# Patient Record
Sex: FEMALE | Race: OTHER | HISPANIC OR LATINO | URBAN - METROPOLITAN AREA
[De-identification: names, ages, dates, MRNs, and addresses within clinical notes are randomized per-mention and may not be internally consistent; named-entity substitution may affect disease eponyms.]

---

## 2023-10-24 NOTE — PROGRESS NOTES
COMP EXAM, FMX, PROBE EXAM   Pt's preferred language is Portuguese- used  # [id number]    REVIEWED MED HX:   -last physical over 2 years ago. Pt is interested in scheduling for a physical  No current meds, allergies or health concerns    CHIEF CONCERN:     -Last dental visit was 2 years ago   - pt " here for checkup, but I'm having pain in 2 teeth. One on UR and one on UL. When I eat, the food gets stuck in the holes. No current pain reported. PAIN SCALE:  0  ASA CLASS:  I  PLAQUE:  mild   CALCULUS:  mod calculus  BLEEDING:   Some bleeding upon probing  STAIN :   none   ORAL HYGIENE:  needs improvement   PERIO:     Visual and Tactile Intraoral/ Extraoral evaluation: Oral and Oropharyngeal cancer evaluation. No findings     Dr. Rissa Raymundo exam=   Reviewed with patient clinical and radiographic findings and patient verbalized understanding. All questions and concerns addressed.      REFERRALS: no referrals needed    CARIES FINDINGS: 3-OL, 4-MOD, 5- DO, 8- DIFL, 12-DO, 13-MOD, 17-O, 20-MOB, 31-OB, 32-O       NEXT VISIT:   1)    Last FMX :

## 2023-10-25 ENCOUNTER — OFFICE VISIT (OUTPATIENT)
Dept: DENTISTRY | Facility: CLINIC | Age: 43
End: 2023-10-25

## 2023-10-25 VITALS — SYSTOLIC BLOOD PRESSURE: 106 MMHG | TEMPERATURE: 87 F | DIASTOLIC BLOOD PRESSURE: 71 MMHG

## 2023-10-25 DIAGNOSIS — Z01.20 ENCOUNTER FOR DENTAL EXAMINATION: Primary | ICD-10-CM

## 2023-10-25 DIAGNOSIS — Z00.00 PHYSICAL EXAM: ICD-10-CM

## 2023-10-25 PROBLEM — K05.30 PERIODONTITIS: Status: ACTIVE | Noted: 2023-10-25

## 2023-10-25 PROCEDURE — D0210 INTRAORAL - COMPLETE SERIES OF RADIOGRAPHIC IMAGES: HCPCS

## 2023-10-25 PROCEDURE — D0150 COMPREHENSIVE ORAL EVALUATION - NEW OR ESTABLISHED PATIENT: HCPCS

## 2023-10-25 NOTE — PATIENT INSTRUCTIONS
Caries en los cheryl     CUIDADO AMBULATORIO:     La caries dental , también llamada cavidad dental, son orificios en los dientes causados por bacterias. Las bacterias se mezclan con los carbohidratos de los alimentos y generan ácidos. El ácido descompone áreas del esmalte que cubre la parte exterior del diente. Las caries en la infancia son orificios que se bonifacio en los dientes de cruz hijo o [place]. [place] suele ocurrir [employer] [address] [place]. Las cavidades pueden comenzar tan pronto martínez el diente comienza a erupcionar (Idelia Solid a través del tejido de la encía). Las caries en la infancia a veces se llaman caries del biberón nocturno o del biberón del bebé. Las caries son causadas por chelle bacteria. Las bacterias se mezclan con los carbohidratos de los alimentos y generan ácidos. El ácido descompone áreas del esmalte que cubre la parte exterior del diente. Los signos y síntomas comunes son: Los signos más tempranos son manchas lupe a lo alanna de la línea de las encías, cerca de los dientes delanteros superiores. Es posible que no pueda colby estas manchas. Es posible que cruz hijo no tenga ningún síntoma si la caries recién ha empezado a formarse. Cuando la caries llega a partes más profundas de los dientes de cruz hijo, podría sentir dolor. Es posible que cruz hijo también presente cualquiera de los siguientes:  Cruz hijo siente dolor al masticar o comer alimentos calientes o fríos    Dientes de color simpson tiza, amarillos o marrones    Hinchazón de las encías    Busque atención médica de inmediato si:  Cruz hijo tiene dolor intenso en el diente o la [place]. Cruz hijo tiene hinchazón en [facility]. Llame al dentista de cruz hijo si:  Cruz hijo tiene fiebre. El dolor dental de cruz hijo empeora. Usted tiene preguntas o inquietudes sobre la condición o el cuidado de cruz hijo. El tratamiento de las caries en la infancia es importante, incluso en los dientes de [place].  Dientes sanos a chelle edad temprana ayudarán a que cruz hijo tenga dientes saludables martínez adulto. Dependiendo de la edad de cruz hijo, también podría necesitar cualquiera de los siguientes tratamientos:  Los tratamientos con flúor se pueden administrar marija la consulta dental. Cruz hijo podría usar productos con flúor en casa. El dentista de cruz hijo le indicará qué tipo de flúor necesita cruz hijo y cómo usarlo. Un empaste puede aplicársele en el diente de cruz hijo después de que le hayan extraído la parte deteriorada. El empaste podría ayudar a evitar un deterioro mayor en el diente de cruz hijo. Un conducto radicular podría ser necesario si el diente está infectado o si la caries es grave. Ayude a prevenir las caries en la infancia:  Lleve cruz hijo al odontólogo 2 veces al [employer]. Cruz hijo debería empezar a colby un dentista cuando aparezca el primer diente o cuando cumpla un año. El dentista puede encontrar y tratar los problemas de Phoebe Frees. [place] puede ayudar a prevenir las caries dentales. El dentista puede darle a cruz hijo un tratamiento con fluoruro para ayudar a prevenir las caries. El dentista de cruz hijo puede recetarle un suplemento de flúor si el suministro de agua local tiene un nivel bajo de flúor. No acueste a cruz trip a dormir o nick chelle siesta con el biberón. [employer], la de fórmula y el jugo de frutas contienen azúcar. Si cruz trip se duerme con el biberón en la boca, estos líquidos pueden quedarse en cruz boca y causarle caries. En cambio, sostenga a cruz hijo mientras lo alimenta. Limpie los dientes de cruz hijo con un paño limpio después de alimentarlo. Luego póngalo a dormir. Alimente a cruz trip con alimentos y bebidas saludables. Escoja alimentos y bebidas que julius bajos en azúcar. Sharron las etiquetas de los alimentos y bebidas para saber cuáles alimentos escoger que julius bajos en azúcar. Limite los dulces, las galletas y las [address]  No moje el chupete de cruz hijo en azúcar, almíbar o cualquier otro líquido tiffanie. Limite el consumo de jugo de frutas según indicaciones. El jugo de frutas tiene alto contenido de azúcar. No le de jugo de frutas a cruz bebé en un biberón. No le de jugo de frutas a cruz hijo en un vaso que pueda llevar consigo marija el día. Desde los 6 meses hasta el primer año, limite el consumo de jugo de frutas a 4 onzas por día. De 1 a 6 años, limite la cantidad de 4 a 6 onzas por día. Enséñele a cruz hijo a beber de chelle taza común tan pronto martínez sea posible. Cruz hijo debería poder beber de chelle taza a la edad de 12 meses. Wilhemena Gory común hará que cruz hijo lauri más lento y con Shanita Nipper. [place] significa que va a beber líquidos azucarados más lentamente que con un biberón o un vaso con sorbete. Cómo cepillarle los dientes a cruz hijo:  Desde el nacimiento hasta 1 año: use chelle toallita para limpiar las encías de cruz bebé. Puede empezar a [employer] de cruz bebé tan pronto martínez comiencen a aparecer. Utilice un cepillo de dientes de bebé con un cabezal suave. Ponga chelle pequeña cantidad (del tamaño de un grano de arroz) de pasta dental con flúor en el cepillo de dientes. Pase chelle toallita sobre los dientes para eliminar cualquier jefferson de pasta dental. Cepille 1 vez por día. Niños de 1 a 3 años: cruz hijo necesita cepillarse los dientes 2 veces al día. Cepíllele los dientes a cruz trip con un cepillo de dientes para niños y [place]. El médico de cruz hijo puede recomendarle que le cepille los dientes con chelle pequeña cantidad de pasta dental que contenga flúor. Asegúrese de que cruz trip escupa toda la pasta de dientes de cruz boca. No necesita enjuagarse con agua. La pequeña cantidad de pasta de dientes que permanece en la boca de cruz hijo puede ayudar a prevenir las caries. Niños de 3 a 6 años: cruz hijo necesita cepillarse los dientes con pasta dental con flúor 2 veces al día. Usted también debería pasarle hilo dental a cruz hijo chelle vez al día. Cepille marija 2 minutos martínez mínimo.  Aplique chelle cantidad del tamaño de un guisante de pasta dental en el cepillo de dientes. Asegúrese de que cruz trip escupa toda la pasta de dientes de cruz boca. No necesita enjuagarse con agua. La pequeña cantidad de pasta de dientes que permanece en la boca de cruz hijo puede ayudar a prevenir las caries. Ayude a cruz hijo con el cuidado de los [address] que pueda [employer] Cruz hijo debe comenzar a cuidar codi dientes a los 7 u 8 años. Cepille marija 2 minutos, 2 veces cada día. Wilkins Bienenstock puede ser reproducir chelle canción que dure 2 minutos martínez mínimo mientras cruz hijo se cepilla. Solo debería necesitar hacer esto hasta que cruz hijo se acostumbre a la cantidad de [place]. Ban que cruz hijo escupa la pasta dental después del cepillado. No necesita enjuagarse con agua. La pequeña cantidad de pasta de dientes que permanece en la boca de cruz hijo puede ayudar a prevenir las caries. Cruz hijo también necesita usar el hilo dental 1 vez al día. Acuda a las consultas de control con el dentista según le indicaron: Anote codi preguntas para que se acuerde de hacerlas marija codi visitas. © Copyright [employer] 2022 Information is for End User's use only and may not be sold, redistributed or otherwise used for commercial purposes. All illustrations and images included in CareNotes® are the copyrighted property of A.D.A.M., Inc. [address] es sólo para uso en educación. Cruz intención no es darle un consejo médico sobre enfermedades o tratamientos. Colsulte con cruz [facility] farmacéutico antes de seguir cualquier régimen médico para saber si es seguro y efectivo para usted. Enfermedad periodontal   LO QUE NECESITA SABER:   ¿Qué es la enfermedad periodontal? A la enfermedad periodontal también se la conoce martínez enfermedad de las encías. Puede ser leve y causar problemas, martínez mal aliento. Puede progresar y, en los casos graves, puede destruir el hueso y derivar en la pérdida del diente.   ¿Qué causa la enfermedad de las encías? Generalmente, la causa de la enfermedad de las encías es la acumulación de placa en los dientes y las encías. La placa contiene bacterias que son perjudiciales para el tejido de la encía. ¿Qué aumenta mi riesgo de tener enfermedad de las encías? La brian higiene bucal, por ejemplo, no cepillarse los dientes y no usar el hilo dental     Nutrición deficiente     Determinados medicamentos que reducen la cantidad de saliva en la boca     Los factores genéticos o las enfermedades, martínez la diabetes y el síndrome de [place]     El hábito de fumar o mascar tabaco     ¿Cuáles son los signos y síntomas de la enfermedad de las encías? En las primeras etapas de la enfermedad de las encías (gingivitis):      Las encías le sangran al cepillarse los dientes o usar el hilo dental.     [address] encías inflamadas, enrojecidas o sensibles. Tiene mal aliento que no desaparece. A medida que la enfermedad de las encías empeora: Se le forma sarro (costra) en los dientes. Las [employer] sangran y se separan de los dientes. Los dientes pueden aflojarse y posiblemente caerse. ¿Cómo se diagnostica la enfermedad de las encías? Gibbons dentista le revisará los dientes y las encías. Le tomará radiografías de la boca. El examen mostrará la ayleen de las encías. Las [employer] ayleen de los [place]. ¿Cómo se trata la enfermedad de las encías? Gibbons dentista puede llevar a cabo la mayor parte del tratamiento para la enfermedad de las encías. El [address] común es chelle buena limpieza dental por encima y por debajo de la línea de las encías. Pueden indicarle que use un enjuague bucal o un gel medicinal para matar las bacterias. En algunos casos, pueden derivarlo a un especialista o puede necesitar cirugía. ¿Cómo puedo evitar la enfermedad de las encías? Cepíllese los dientes 2 veces al día. Cepíllese marija 2 minutos cada vez. Use hilo dental todos los días.  Gibbons dentista puede enseñarle cómo utilizar el hilo dental correctamente. Use chelle crema dental que contenga flúor. Es posible que también deba usar un enjuague bucal que contenga flúor. Use un cepillo de dientes de cerdas suaves. Las cerdas duras pueden dañarle las encías. Cambie cruz cepillo de dientes cada 3 a 4 meses. Los cepillos de dientes desgastados pueden dañar las encías y no quitar sole la placa. Consuma alimentos saludables. Evite los [employer] comidas para disminuir el riesgo de tener enfermedad de las encías. Los alimentos saludables no bonifacio placa tan fácilmente martínez los que tienen un alto contenido de azúcar. Consuma chelle variedad de frutas, verduras, brody magras, pescado, productos lácteos bajos en grasa, panes integrales y frijoles cocidos. No fume y no mastique tabaco. El tabaco [place] el riesgo de tener enfermedad de las encías. Nunca es demasiado tarde para dejar de consumir tabaco. Solicite información a cruz médico si usted necesita ayuda para dejar de fumar. Visite al dentista cada 6 meses o más a menudo martínez se le haya indicado, para limpiezas y cuidados preventivos. Las visitas periódicas ayudarán a la detección temprana de los [facility]. ¿Cuándo jean marie comunicarme con mi dentista? Las encías le sangran habitualmente cuando se cepilla los dientes o [place] el hilo dental.     Tiene las encías hinchadas o sensibles. Irlanda Boom a alejarse de los dientes. Se le aflojan los dientes. Usted tiene preguntas o inquietudes acerca de cruz condición o cuidado. ACUERDOS SOBRE CRUZ CUIDADO:   Usted tiene el derecho de ayudar a planear cruz cuidado. Aprenda todo lo que pueda sobre cruz condición y martínez darle tratamiento. Discuta codi opciones de tratamiento con codi médicos para decidir el cuidado que usted desea recibir. Usted siempre tiene el derecho de rechazar el tratamiento. Esta información es sólo para uso en educación.  Cruz intención no es darle un consejo [employer] An[employer] enfermedades o tratamientos. Colsulte con cruz Cliff Bannister farmacéutico antes de seguir cualquier régimen médico para saber si es seguro y efectivo para usted.   © Copyright Preetshashi Terry 2022 Information is for End User's use only and may not be sold, redistributed or otherwise used for commercial purposes

## 2023-10-25 NOTE — DENTAL PROCEDURE DETAILS
Osmin Olvera presents for a Comprehensive exam. Verbal consent for treatment given in addition to the forms. Reviewed health history - Patient is ASA I  Consents signed: Yes     Perio: Generalized  Pain Scale: 0  Caries Assessment: High  Radiographs: Complete mouth series        COMP EXAM, FMX, PROBE EXAM   Pt's preferred language is Estonian- used  # [id number]    REVIEWED MED HX:   - pt has no medical history in EPIC. No Estonian paper version available in office. Reviewed medical history verbally with patient and . Will need pt to fill out prior to any continued treatment.   -last physical over 2 years ago. Pt is interested in scheduling for a physical  No current meds, allergies or health concerns    CHIEF CONCERN:     -Last dental visit was 2 years ago   - pt " here for checkup, but I'm having pain in 2 teeth. One on UR and one on UL. When I eat, the food gets stuck in the holes. No current pain reported. PAIN SCALE:  0  ASA CLASS:  I  PLAQUE:  mild   CALCULUS:  mod calculus  BLEEDING:   Some bleeding upon probing  STAIN :   none   ORAL HYGIENE:  needs improvement   PERIO: stage 3/ Grade A    Visual and Tactile Intraoral/ Extraoral evaluation: Oral and Oropharyngeal cancer evaluation. No findings     Dr. Xochilt Barton exam=   Reviewed with patient clinical and radiographic findings and patient verbalized understanding. All questions and concerns addressed. ---> discussed translated in Estonian options for #2 with patient. RcT vs Extraction. Pt opt for extraction. ---> pt questioned wear on incisal 8 + 9.  discussed best option is to place crowns. ---> information re: cavities and perio disease placed in AVS, printed and handed to patient in [address] [place] [address]. Pt was informed by  that she needs sc/rp in 2 visits. Bone loss present.      REFERRALS: placed referral to family medicine to try to get patient to schedule a physical.     CARIES FINDINGS: 3-OL, 4-MOD, 5- DO, 8- DIFL, 12-DO, 13-MOD, 17-O, 20-MOB, 31-OB, 32-O.         NEXT VISIT:   1) extractions here for #2 and #16 (severely decayed teeth)  2) sc/rp UR + LR ( 60 min)  3) sc/rp UL + LL ( 60 min)  4) fillings     Last FMX : 10/25/23

## 2023-11-01 ENCOUNTER — OFFICE VISIT (OUTPATIENT)
Dept: DENTISTRY | Facility: CLINIC | Age: 43
End: 2023-11-01

## 2023-11-01 VITALS — SYSTOLIC BLOOD PRESSURE: 107 MMHG | DIASTOLIC BLOOD PRESSURE: 76 MMHG | HEART RATE: 92 BPM

## 2023-11-01 DIAGNOSIS — K08.89 NONRESTORABLE TOOTH: ICD-10-CM

## 2023-11-01 DIAGNOSIS — Z59.9 FINANCIAL DIFFICULTIES: Primary | ICD-10-CM

## 2023-11-01 PROCEDURE — D7140 EXTRACTION, ERUPTED TOOTH OR EXPOSED ROOT (ELEVATION AND/OR FORCEPS REMOVAL): HCPCS

## 2023-11-01 SDOH — ECONOMIC STABILITY - INCOME SECURITY: PROBLEM RELATED TO HOUSING AND ECONOMIC CIRCUMSTANCES, UNSPECIFIED: Z59.9

## 2023-11-01 NOTE — DENTAL PROCEDURE DETAILS
Extraction #2, #16    Patient presents for Ext #2, #16. PMH reviewed, no changes. ASA I. Patient denies medications and medical conditions. Reports no hx of cardiac valve replacement that would require abx prophylaxis. No blood thinners reported. No bodily replacements or implants. Proceeded with extraction appointment. NKFDA    No pain reported by patient at the start of today's appointment. Oral cancer screening completed. No significant findings or soft tissue abnormalities. Obtained a direct and personal consent to ext #2 and #16. Risks and complications were explained. Pt agreed and consented. Consent scanned in doc center. Pre-Op BP WNL. [place] Protocol    Other Assisting Provider: Yes, (Belkis)    Verbal consent obtained? YES  Written consent obtained? YES    Risks, benefits and alternatives discussed?: YES    Consent given by: Oscar Bolt. Reyes    Time Out  Immediately prior to the procedure a time out was called: YES    Time Out: 1:10 PM    A time out verifies correct patient, procedure, equipment, support staff and site/side marked as required. Patient states understanding of procedure being performed: YES    Patient's understanding of procedure matches consent: YES    Procedure consent matches procedure scheduled: YES    Test results available and properly labeled: N/A    Site  Verified with the patient  YES (#2, #16)    Radiology Images displayed and confirmed. If images not available, report reviewed:  YES    Required items - Required blood products, implants, devices and special equipment available: YES    Patient identity confirmed:  YES    Administered  1.7 mL of 2 % Lidocaine w/ 1:100,000 epi via palatal infiltration at root apices #s 2, 16. 3.4 mL of 4% Articaine w/ 1:100,000 epi via papillary infiltration and maxillary infiltration at root apices #s 2, 16 after negative aspiration. Adequate anesthesia obtained. ? Reflected gingiva, elevated, and extracted #2, #16.  Manual alveolar compression. Sockets irrigated. Post-op radiograph completed - confirmed complete removal of tooth structures. Adequate bleeding/clotting. No sutures required. Complications: none (extractions were atraumatic)    Upon dismissal, patient received POI, ice, gauze. Recommended pt abstain from eating until anesthesia wears off to prevent lip/cheek biting. If patient needs to rinse, use warm salt water without swishing or spitting. Abstain from grainy food or small particles that can infiltrate sockets and interfere with normal healing. Recommended pt take OTC pain medications, such as ibuprofen or acetaminophen, every 6-8 hours or as needed. Patient understands POI. Provided written POI in [place]. RX: none    All questions and concerns addressed. Encouraged pt to return for deep cleaning appointments. Patient left comfortably and ambulatory.     Attending: Dr. Rimma Hines    [place]: begin SRPs

## 2023-11-06 ENCOUNTER — PATIENT OUTREACH (OUTPATIENT)
Dept: DENTISTRY | Facility: CLINIC | Age: 43
End: 2023-11-06

## 2023-11-06 NOTE — PROGRESS NOTES
SW CM referral received 11/1 from Dr. Irasema Frazier, DMD r/t pt [facility] needs. Chart review completed. Per chart review, pt completed [facility] assessment with at risk responses for financial resource strain, transportation needs and food insecurity. Per chart review, pt is [place] speaking. Per chart review, pt has not completed financial assistance application for [facility]. OP SWCM placed call to pt using [employer]  #[phone] to introduce self, role and reason for calling. OP SWCM received pt's VM box, but was unable to leave a VM due to pt's mailbox being full. OP SWCM will call back another time.

## 2023-11-07 NOTE — PROGRESS NOTES
SCALE AND RP UR + LR   CHIEF CONCERN: none   PAIN SCALE: 0  ASA CLASS: I  PLAQUE: moderate  CALCULUS: heavy accumulation   BLEEDING: moderate  STAIN : light   ORAL HYGIENE:  fair  PERIO: 3/A    DR. Mancilla Gave 1 carpule 2% Lidocaine 1:100K epi inifltrations on UR.  1 carpule 2% Lidocaine 1:100K epi SURJIT block right and 1 carpule 4% septocaine 1:100K epi infiltrations on lower right. Pt tolerated very well. Hand scaled, polished and flossed. Used cavitron    Oral Hygiene Instruction:  recommended brushing 2 x daily for 2 minutes MIN, recommended flossing daily, reviewed dietary precautions.  Post op sc/rp instructions in Portuguese placed in AVS and printed for patient    Soft tissue exam:  soft tissue exam was normal  ExtraOral exam:   Extraoral exam was normal    REFERRALS: no referrals needed         NEXT VISIT:   --->sc/rp UL + LL next visit    Last  FMX : 10/25/23

## 2023-11-08 ENCOUNTER — OFFICE VISIT (OUTPATIENT)
Dept: DENTISTRY | Facility: CLINIC | Age: 43
End: 2023-11-08

## 2023-11-08 VITALS — HEART RATE: 108 BPM | DIASTOLIC BLOOD PRESSURE: 78 MMHG | SYSTOLIC BLOOD PRESSURE: 122 MMHG

## 2023-11-08 DIAGNOSIS — K05.6 PERIODONTAL DISEASE: Primary | ICD-10-CM

## 2023-11-08 PROCEDURE — D4341 PERIODONTAL SCALING AND ROOT PLANING - 4 OR MORE TEETH PER QUADRANT: HCPCS

## 2023-11-08 NOTE — PATIENT INSTRUCTIONS
Instrucciones posoperatorias de raspado + alisado radicular    Le realizaron un procedimiento llamado raspado + alisado radicular (SRP, por codi siglas en inglés). Alejandro es un tratamiento no quirúrgico de la periodontitis (enfermedad de las encías). El propósito de alejandro tratamiento es eliminar la placa bacteriana y el sarro de la superficie de la raíz debajo de la línea de las encías, ya que pueden causar problemas de ayleen graves, incluida la pérdida [place]. El objetivo de alejandro tratamiento es permitir la reinserción de las encías a la superficie limpia de la raíz y reducir las bolsas periodontales a niveles que se puedan mantener con el uso diario de hilo dental y con el cepillado. Seguimiento del procedimiento  Cuando se [place] anestesia, los labios, dientes y lengua pueden permanecer entumecidos después del procedimiento. El tiempo que están entumecidos varía  según el tipo de anestesia recibida. Tenga cuidado con la lengua y los labios. Es fácil morderse o quemarse la lengua o los labios mientras están entumecidos. No coma ni lauri nada que esté muy caliente. Sería conveniente que se abstuviera de comer o beber algo hasta que el entumecimiento haya desaparecido. Malestar o dolor  Es normal que marija unos días sienta dolor en el tejido de las encías. Puede  nick un analgésico de venta lenny martínez Advil o Aleve si tiene sensibilidad en las encías. Si debe evitar estos analgésicos porque ya está tomando fármacos antiinflamatorios no esteroideos (NSAID, por codi siglas en inglés), es alérgico a ellos o tiene úlceras, entonces puede nick acetaminofén (Tylenol). Siga las recomendaciones de dosificación de las etiquetas de los productos. Sensibilidad en los dientes  Es normal que los dientes estén más sensibles a las temperaturas frías o calientes o a los dulces. [place] ocurre a [employer] tejido de las encías se [place] y se reduce de [place].  Esta sensibilidad es la [address] frecuente después del raspado y [place] radicular. [place] sensibilidad debería desaparecer de a poco en unas pocas semanas. Cepillarse los dientes todos los días con pasta dental para la sensibilidad o usar enjuagues con flúor puede ayudar a aliviar esto con [employer]. Evite fumar/consumir productos de tabaco  Se debe evitar fumar y consumir productos de tabaco por un mínimo de 24 a 48 horas. El consumo de tabaco retrasa el proceso de curación y [place] la posibilidad de tener complicaciones posoperatorias. Se recomienda dejar de fumar. Sangrado  El sangrado leve puede continuar marija 24 a 48 horas. Munjor es normal y debería disminuir. Puede volver al [place], kandy se recomienda limitar la actividad extenuante marija las 24 horas después del procedimiento, ya que esto puede aumentar el sangrado. Marija el jefferson del día, evite enjuagarse la boca enérgicamente, ya que esto prolongará el sangrado. Si el sangrado continúa, ejerza chelle leve presión en la genie con [place] gasa humedecida o chelle bolsita de té humedecida. Déjela en el lugar por 20 o 30 minutos. Si aún le preocupa el sangrado después de seguir estas instrucciones, comuníquese con nuestro consultorio. Comida/alimentación  Puede comer según lo tolere después de que se le haya annie el entumecimiento. Cruz próxima comida debe consistir de alimentos blandos (pasta, huevos revueltos, puré de lashonda, macarrones con [place], etc.). Evite las comidas duras y crujientes martínez lashonda fritas, palomitas de maíz, nueces o semillas, bebidas  alcohólicas y líquidos calientes por hoy. Siga con el cuidado en cruz casa/las visitas de seguimiento  Reanude cruz rutina de cuidado de la ayleen bucal en cruz casa cepillándose los dientes 2 veces al día y usando hilo dental 1 vez al día, incluso si tiene sangrado o sensibilidad en las encías.  La higiene dental adecuada en cruz casa y las visitas de rutina a nuestro consultorio dental son las formas más importantes con las que puede mantener la ayleen de las encías después del raspado y alisado radicular. Es perez enjuagar la boca con agua tibia con sal (1/4 de cucharadita de sal en un vaso lleno de agua convenientemente muy tibia) o con un enjuague bucal antimicrobiano después del procedimiento. [address] [facility], las encías deberían verse más rosadas, estar menos hinchadas y sangrar menos.  Estas son señales de curación y mejora de la ayleen periodontal. Se recomiendan limpiezas de mantenimiento de 3 a 4 meses para controlar cruz progreso y tratar la enfermedad periodontal.

## 2024-02-08 ENCOUNTER — PATIENT OUTREACH (OUTPATIENT)
Dept: DENTISTRY | Facility: CLINIC | Age: 44
End: 2024-02-08

## 2024-02-08 NOTE — LETTER
02/08/24    Estimado/a Juana Bojorge Reyes, Soy un coordinador de la atención médica en [facility]  [address] [facility] [address] [phone]. Intentamos comunicarnos con usted por teléfono varias veces. Es importante que se comunique con nosotros al [phone] para que podamos ofrecerle ayuda con codi necesidades de atención médica.     Atentamente.     HAYLEE Bradley

## 2024-02-08 NOTE — PROGRESS NOTES
UTR letter sent to pt via Mail. Pt has been unable to reach due to lack of VM box and pt does not answer phone calls. Pt has not been back in office to be seen and has no upcoming appts.

## 2024-04-01 ENCOUNTER — OFFICE VISIT (OUTPATIENT)
Dept: DENTISTRY | Facility: CLINIC | Age: 44
End: 2024-04-01

## 2024-04-01 VITALS — HEART RATE: 73 BPM | DIASTOLIC BLOOD PRESSURE: 77 MMHG | SYSTOLIC BLOOD PRESSURE: 117 MMHG

## 2024-04-01 DIAGNOSIS — K05.6 PERIODONTAL DISEASE: Primary | ICD-10-CM

## 2024-04-01 PROCEDURE — D4342 PERIODONTAL SCALING AND ROOT PLANING - 1 TO 3 TEETH PER QUADRANT: HCPCS

## 2024-04-01 NOTE — PATIENT INSTRUCTIONS
Instrucciones posoperatorias de raspado + alisado radicular    Le realizaron un procedimiento llamado raspado + alisado radicular (SRP, por codi siglas en inglés). Alejandro es un tratamiento no quirúrgico de la periodontitis (enfermedad de las encías). El propósito de alejandro tratamiento es eliminar la placa bacteriana y el sarro de la superficie de la raíz debajo de la línea de las encías, ya que pueden causar problemas de ayleen graves, incluida la pérdida [place]. El objetivo de alejandro tratamiento es permitir la reinserción de las encías a la superficie limpia de la raíz y reducir las bolsas periodontales a niveles que se puedan mantener con el uso diario de hilo dental y con el cepillado.  Seguimiento del procedimiento  Cuando se usa anestesia, los labios, dientes y lengua pueden permanecer entumecidos después del procedimiento. El tiempo que están entumecidos varía  según el tipo de anestesia recibida. Tenga cuidado con la lengua y los labios. Es fácil morderse o quemarse la lengua o los labios mientras están entumecidos. No coma ni lauri nada que esté muy caliente. Sería conveniente que se abstuviera de comer o beber algo hasta que el entumecimiento haya desaparecido.  Malestar o dolor  Es normal que marija unos días sienta dolor en el tejido de las encías. Puede  nick un analgésico de venta lenny martínez Advil o Aleve si tiene sensibilidad en las encías. Si debe evitar estos analgésicos porque ya está tomando fármacos antiinflamatorios no esteroideos (NSAID, por codi siglas en inglés), es alérgico a ellos o tiene úlceras, entonces puede nick acetaminofén (Tylenol). Siga las recomendaciones de dosificación de las etiquetas de los productos.  Sensibilidad en los dientes  Es normal que los dientes estén más sensibles a las temperaturas frías o calientes o a los dulces. [place] ocurre a medida que el tejido de las encías se sr[place] y se reduce de tamaño. Esta sensibilidad es la queja más frecuente después del raspado y alisado  radicular. [place] sensibilidad debería desaparecer de a poco en unas pocas semanas. Cepillarse los dientes todos los días con pasta dental para la sensibilidad o usar enjuagues con flúor puede ayudar a aliviar esto con el tiempo.  Evite fumar/consumir productos de tabaco  Se debe evitar fumar y consumir productos de tabaco por un mínimo de 24 a 48 horas. El consumo de tabaco retrasa el proceso de curación y aumenta la posibilidad de tener complicaciones posoperatorias. Se recomienda dejar de fumar.  Sangrado  El sangrado leve puede continuar marija 24 a 48 horas. [place] es normal y debería disminuir. Puede volver al trabajo, kandy se recomienda limitar la actividad extenuante marija las 24 horas después del procedimiento, ya que esto puede aumentar el sangrado. Marija el jefferson del día, evite enjuagarse la boca enérgicamente, ya que esto prolongará el sangrado. Si el sangrado continúa, ejerza chelle leve presión en la genie con chelle gasa humedecida o chelle bolsita de té humedecida. Déjela en el lugar por 20 o 30 minutos. Si aún le preocupa el sangrado después de seguir estas instrucciones, comuníquese con nuestro consultorio.  Comida/alimentación  Puede comer según lo tolere después de que se le haya annie el entumecimiento. Cruz próxima comida debe consistir de alimentos blandos (pasta, huevos revueltos, puré de lashonda, macarrones con queso, etc.). Evite las comidas duras y crujientes martínez lashonda fritas, palomitas de maíz, nueces o semillas, bebidas  alcohólicas y líquidos calientes por hoy.  Siga con el cuidado en cruz casa/las visitas de seguimiento  Reanude cruz rutina de cuidado de la ayleen bucal en cruz casa cepillándose los dientes 2 veces al día y usando hilo dental 1 vez al día, incluso si tiene sangrado o sensibilidad en las encías. La higiene dental adecuada en cruz casa y las visitas de rutina a nuestro consultorio dental son las formas más importantes con las que puede mantener la ayleen de las encías después del raspado y  alisado radicular. Es perez enjuagar la boca con agua tibia con sal (1/4 de cucharadita de sal en un vaso lleno de agua convenientemente muy tibia) o con un enjuague bucal antimicrobiano después del procedimiento. Después del tratamiento, las encías deberían verse más rosadas, estar menos hinchadas y sangrar menos. Estas son señales de curación y mejora de la ayleen periodontal. Se recomiendan limpiezas de mantenimiento de 3 a 4 meses para controlar cruz progreso y tratar la enfermedad periodontal.

## 2024-04-01 NOTE — PROGRESS NOTES
SCALE AND RP LL   Local anesthesia administered by Dr. Mancilla. Gave 1 Carpule 4% Septocaine 1:100K epi infiltrations on Lower Left Quadrant.   11/8/23 SC/RP Completed on UR + LR   Pt arrived 13 min late for 50 min sc/rp appointment                      CHIEF CONCERN: none   PAIN SCALE: 0  ASA CLASS: I  PLAQUE:  moderate    CALCULUS:   moderate       BLEEDING:     moderate     STAIN :none  ORAL HYGIENE:   fair     PERIO: 3/A    Hand scaled, polished and flossed. Used cavitron    Oral Hygiene Instruction:  recommended brushing 2 x daily for 2 minutes MIN, recommended flossing daily, reviewed dietary precautions.post op sc/rp instructions placed in AVS, printed and handed/ reviewed with patient.     Soft tissue exam:  soft tissue exam was normal  ExtraOral exam:   Extraoral exam was normal    REFERRALS: no referrals needed         NEXT VISIT:   --->sc/rp UL    Last FMX : 11/25/23

## 2025-02-07 ENCOUNTER — OFFICE VISIT (OUTPATIENT)
Age: 45
End: 2025-02-07
Payer: OTHER MISCELLANEOUS

## 2025-02-07 VITALS — WEIGHT: 200 LBS | BODY MASS INDEX: 44.99 KG/M2 | HEIGHT: 56 IN

## 2025-02-07 DIAGNOSIS — M99.00 SOMATIC DYSFUNCTION OF OCCIPITOCERVICAL REGION: ICD-10-CM

## 2025-02-07 DIAGNOSIS — M99.02 SEGMENTAL DYSFUNCTION OF THORACIC REGION: ICD-10-CM

## 2025-02-07 DIAGNOSIS — S13.9XXA CERVICAL SPRAIN, INITIAL ENCOUNTER: Primary | ICD-10-CM

## 2025-02-07 DIAGNOSIS — G44.219 TTH (TENSION-TYPE HEADACHE), FREQUENT EPISODIC TYPE: ICD-10-CM

## 2025-02-07 DIAGNOSIS — M50.30 DDD (DEGENERATIVE DISC DISEASE), CERVICAL: ICD-10-CM

## 2025-02-07 PROCEDURE — 98940 CHIROPRACT MANJ 1-2 REGIONS: CPT | Performed by: CHIROPRACTOR

## 2025-02-07 PROCEDURE — 97110 THERAPEUTIC EXERCISES: CPT | Performed by: CHIROPRACTOR

## 2025-02-07 PROCEDURE — 99204 OFFICE O/P NEW MOD 45 MIN: CPT | Performed by: CHIROPRACTOR

## 2025-02-07 RX ORDER — MELOXICAM 15 MG/1
15 TABLET ORAL DAILY
COMMUNITY
Start: 2025-01-07

## 2025-02-07 RX ORDER — TRAMADOL HYDROCHLORIDE 50 MG/1
50 TABLET ORAL EVERY 8 HOURS PRN
COMMUNITY
Start: 2024-11-11 | End: 2025-11-11
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
## 2025-02-13 ENCOUNTER — PROCEDURE VISIT (OUTPATIENT)
Age: 45
End: 2025-02-13
Payer: OTHER MISCELLANEOUS

## 2025-02-13 VITALS — WEIGHT: 200 LBS | BODY MASS INDEX: 35.44 KG/M2 | HEIGHT: 63 IN

## 2025-02-13 DIAGNOSIS — G44.219 TTH (TENSION-TYPE HEADACHE), FREQUENT EPISODIC TYPE: ICD-10-CM

## 2025-02-13 DIAGNOSIS — M99.00 SOMATIC DYSFUNCTION OF OCCIPITOCERVICAL REGION: ICD-10-CM

## 2025-02-13 DIAGNOSIS — M50.30 DDD (DEGENERATIVE DISC DISEASE), CERVICAL: ICD-10-CM

## 2025-02-13 DIAGNOSIS — S13.9XXA CERVICAL SPRAIN, INITIAL ENCOUNTER: Primary | ICD-10-CM

## 2025-02-13 DIAGNOSIS — M99.02 SEGMENTAL DYSFUNCTION OF THORACIC REGION: ICD-10-CM

## 2025-02-13 PROCEDURE — 97110 THERAPEUTIC EXERCISES: CPT | Performed by: CHIROPRACTOR

## 2025-02-13 PROCEDURE — 98940 CHIROPRACT MANJ 1-2 REGIONS: CPT | Performed by: CHIROPRACTOR

## 2025-02-13 NOTE — PROGRESS NOTES
Initial date of service: 2/7/25    Diagnoses and all orders for this visit:    Cervical sprain, initial encounter    DDD (degenerative disc disease), cervical    Segmental dysfunction of thoracic region    TTH (tension-type headache), frequent episodic type    Somatic dysfunction of occipitocervical region    Mechanical neck/ubp and headache causally related to WC injury. Pt responded to tx today with reduced pain and increased ROM    TREATMENT: 93327, [id number]   Ther-ex: IASTM - discussed post procedure soreness and/or ecchymosis for up to 36 hrs, applied to affected mm hypertonicities; wall darrin, axial retraction, upper trap stretch, lev scap stretch, SCM stretch; greater than 15 min spent performing above mentioned ther-ex to improve ROM/flexibility. Thoracic mobilization/manipulation: prone P-A mob, supine A-P manip; cervical mobilization/manipulation: traction, diversified supine graded mobilization    HPI:  Juana Bojorge Reyes is a 44 y.o. female   Chief Complaint   Patient presents with    Neck Pain     Neck pain-6     Pt presents utilizing  for tx for neck and RUE pain onset 8/20/24 with work injury    Neck Pain   This is a new problem. The current episode started more than 1 month ago. The problem occurs constantly. The problem has been waxing and waning. The pain is present in the midline, right side and occipital region. The quality of the pain is described as aching, stabbing and shooting. The symptoms are aggravated by bending, position, twisting and sneezing (sitting). Worse during: worst at end of day. Associated symptoms include headaches. Associated symptoms comments: Suboccipital and occipital. She has tried heat and NSAIDs for the symptoms. The treatment provided mild relief.     History reviewed. No pertinent past medical history.   The following portions of the patient's history were reviewed and updated as appropriate: allergies, past family history, past medical history, past social  history, past surgical history, and problem list.  Review of Systems   Musculoskeletal:  Positive for neck pain.   Neurological:  Positive for headaches.     Physical Exam  Neck:        Comments: Pnful and limited in Rrot 30 degrees, Rrot 45 degrees, Rlf 10, Ext 5, Fl 30  Musculoskeletal:      Cervical back: Pain with movement and muscular tenderness present. Decreased range of motion.      Thoracic back: Spasms and tenderness present. Decreased range of motion.   Lymphadenopathy:      Cervical: No cervical adenopathy.   Neurological:      Mental Status: She is alert and oriented to person, place, and time.      Gait: Gait is intact.   Psychiatric:         Mood and Affect: Mood and affect normal.     SOFT TISSUE ASSESSMENT: Hypertonicity and tenderness palpated B C5-T6 erector spinae, upper traps, R lev scap, R SCM, R scalene, R rhomboid, B suboccipitals JOINT RECTRICTIONS: C0/1, C4-T6     Return in about 1 week (around 2/20/2025) for Next scheduled follow up.

## 2025-02-20 ENCOUNTER — PROCEDURE VISIT (OUTPATIENT)
Age: 45
End: 2025-02-20
Payer: OTHER MISCELLANEOUS

## 2025-02-20 VITALS — HEIGHT: 63 IN | BODY MASS INDEX: 35.44 KG/M2 | WEIGHT: 200 LBS

## 2025-02-20 DIAGNOSIS — G44.219 TTH (TENSION-TYPE HEADACHE), FREQUENT EPISODIC TYPE: ICD-10-CM

## 2025-02-20 DIAGNOSIS — M99.00 SOMATIC DYSFUNCTION OF OCCIPITOCERVICAL REGION: ICD-10-CM

## 2025-02-20 DIAGNOSIS — S13.9XXA CERVICAL SPRAIN, INITIAL ENCOUNTER: Primary | ICD-10-CM

## 2025-02-20 DIAGNOSIS — M50.30 DDD (DEGENERATIVE DISC DISEASE), CERVICAL: ICD-10-CM

## 2025-02-20 DIAGNOSIS — M99.02 SEGMENTAL DYSFUNCTION OF THORACIC REGION: ICD-10-CM

## 2025-02-20 PROCEDURE — 97110 THERAPEUTIC EXERCISES: CPT | Performed by: CHIROPRACTOR

## 2025-02-20 PROCEDURE — 98940 CHIROPRACT MANJ 1-2 REGIONS: CPT | Performed by: CHIROPRACTOR

## 2025-02-20 NOTE — PROGRESS NOTES
Initial date of service: 2/7/25    Diagnoses and all orders for this visit:    Cervical sprain, initial encounter    DDD (degenerative disc disease), cervical    Segmental dysfunction of thoracic region    TTH (tension-type headache), frequent episodic type    Somatic dysfunction of occipitocervical region    Mechanical neck/ubp and headache causally related to WC injury. Pt responded to tx today with reduced pain and increased ROM. Discussed alternating moist heat and ice 10-20 min on/off at end of day to prevent stabbing pain    TREATMENT: 60131, [id number]   Ther-ex: IASTM - discussed post procedure soreness and/or ecchymosis for up to 36 hrs, applied to affected mm hypertonicities; wall darrin, axial retraction, upper trap stretch, lev scap stretch, SCM stretch; greater than 15 min spent performing above mentioned ther-ex to improve ROM/flexibility. Thoracic mobilization/manipulation: prone P-A mob, supine A-P manip; cervical mobilization/manipulation: traction, diversified supine graded mobilization    HPI:  Juana Bojorge Reyes is a 44 y.o. female   Chief Complaint   Patient presents with    Neck Pain     Neck pain-5     Pt presents utilizing  for tx for neck and RUE pain onset 8/20/24 with work injury  2/20: pt reports yesterday and the day before she felt stabbing pain in right side of neck towards end of her day, but overall better during morning and day    Neck Pain   This is a new problem. The current episode started more than 1 month ago. The problem occurs constantly. The problem has been waxing and waning. The pain is present in the midline, right side and occipital region. The quality of the pain is described as aching, stabbing and shooting. The symptoms are aggravated by bending, position, twisting and sneezing (sitting). Worse during: worst at end of day. Associated symptoms include headaches. Associated symptoms comments: Suboccipital and occipital. She has tried heat and NSAIDs for the  symptoms. The treatment provided mild relief.     History reviewed. No pertinent past medical history.   The following portions of the patient's history were reviewed and updated as appropriate: allergies, past family history, past medical history, past social history, past surgical history, and problem list.  Review of Systems   Musculoskeletal:  Positive for neck pain.   Neurological:  Positive for headaches.     Physical Exam  Neck:        Comments: Pnful and limited in Rrot 30 degrees, Rrot 45 degrees, Rlf 10, Ext 5, Fl 30  Musculoskeletal:      Cervical back: Pain with movement and muscular tenderness present. Decreased range of motion.      Thoracic back: Spasms and tenderness present. Decreased range of motion.   Lymphadenopathy:      Cervical: No cervical adenopathy.   Neurological:      Mental Status: She is alert and oriented to person, place, and time.      Gait: Gait is intact.   Psychiatric:         Mood and Affect: Mood and affect normal.     SOFT TISSUE ASSESSMENT: Hypertonicity and tenderness palpated B C5-T6 erector spinae, upper traps, R lev scap, R SCM, R scalene, R rhomboid, B suboccipitals JOINT RECTRICTIONS: C0/1, C4-T6     Return in about 1 week (around 2/27/2025) for Next scheduled follow up.

## 2025-02-27 ENCOUNTER — PROCEDURE VISIT (OUTPATIENT)
Age: 45
End: 2025-02-27
Payer: OTHER MISCELLANEOUS

## 2025-02-27 VITALS — WEIGHT: 200 LBS | HEIGHT: 63 IN | BODY MASS INDEX: 35.44 KG/M2

## 2025-02-27 DIAGNOSIS — G44.219 TTH (TENSION-TYPE HEADACHE), FREQUENT EPISODIC TYPE: ICD-10-CM

## 2025-02-27 DIAGNOSIS — M99.00 SOMATIC DYSFUNCTION OF OCCIPITOCERVICAL REGION: ICD-10-CM

## 2025-02-27 DIAGNOSIS — M99.02 SEGMENTAL DYSFUNCTION OF THORACIC REGION: ICD-10-CM

## 2025-02-27 DIAGNOSIS — M50.30 DDD (DEGENERATIVE DISC DISEASE), CERVICAL: ICD-10-CM

## 2025-02-27 DIAGNOSIS — S13.9XXA CERVICAL SPRAIN, INITIAL ENCOUNTER: Primary | ICD-10-CM

## 2025-02-27 PROCEDURE — 97110 THERAPEUTIC EXERCISES: CPT | Performed by: CHIROPRACTOR

## 2025-02-27 PROCEDURE — 98940 CHIROPRACT MANJ 1-2 REGIONS: CPT | Performed by: CHIROPRACTOR

## 2025-02-27 NOTE — PROGRESS NOTES
Initial date of service: 2/7/25    Diagnoses and all orders for this visit:    Cervical sprain, initial encounter    DDD (degenerative disc disease), cervical    Segmental dysfunction of thoracic region    TTH (tension-type headache), frequent episodic type    Somatic dysfunction of occipitocervical region    Mechanical neck/ubp and headache causally related to WC injury. Pt slowly improving; responded to tx today with reduced pain and increased ROM. Re-eval next week    TREATMENT: 95320, [id number]   Ther-ex: IASTM - discussed post procedure soreness and/or ecchymosis for up to 36 hrs, applied to affected mm hypertonicities; wall darrin, axial retraction, upper trap stretch, lev scap stretch, SCM stretch; greater than 15 min spent performing above mentioned ther-ex to improve ROM/flexibility. Thoracic mobilization/manipulation: prone P-A mob, supine A-P manip; cervical mobilization/manipulation: traction, diversified supine graded mobilization    HPI:  Juana Bojorge Reyes is a 44 y.o. female   Chief Complaint   Patient presents with    Neck Pain     Neck pain-5     Pt presents utilizing  for tx for neck and RUE pain onset 8/20/24 with work injury  2/20: pt reports yesterday and the day before she felt stabbing pain in right side of neck towards end of her day, but overall better during morning and day    Neck Pain   This is a new problem. The current episode started more than 1 month ago. The problem occurs constantly. The problem has been waxing and waning. The pain is present in the midline, right side and occipital region. The quality of the pain is described as aching, stabbing and shooting. The symptoms are aggravated by bending, position, twisting and sneezing (sitting). Worse during: worst at end of day. Associated symptoms include headaches. Associated symptoms comments: Suboccipital and occipital. She has tried heat and NSAIDs for the symptoms. The treatment provided mild relief.     History reviewed.  No pertinent past medical history.   The following portions of the patient's history were reviewed and updated as appropriate: allergies, past family history, past medical history, past social history, past surgical history, and problem list.  Review of Systems   Musculoskeletal:  Positive for neck pain.   Neurological:  Positive for headaches.     Physical Exam  Neck:        Comments: Pnful and limited in Rrot 30 degrees, Rrot 45 degrees, Rlf 10, Ext 5, Fl 30  Musculoskeletal:      Cervical back: Pain with movement and muscular tenderness present. Decreased range of motion.      Thoracic back: Spasms and tenderness present. Decreased range of motion.   Lymphadenopathy:      Cervical: No cervical adenopathy.   Neurological:      Mental Status: She is alert and oriented to person, place, and time.      Gait: Gait is intact.   Psychiatric:         Mood and Affect: Mood and affect normal.     SOFT TISSUE ASSESSMENT: Hypertonicity and tenderness palpated B C5-T6 erector spinae, upper traps, R lev scap, R SCM, R scalene, R rhomboid, B suboccipitals JOINT RECTRICTIONS: C0/1, C4-T6     Return in about 1 week (around 3/6/2025) for Next scheduled follow up.

## 2025-03-06 ENCOUNTER — PROCEDURE VISIT (OUTPATIENT)
Age: 45
End: 2025-03-06
Payer: OTHER MISCELLANEOUS

## 2025-03-06 VITALS — HEIGHT: 63 IN | WEIGHT: 200 LBS | BODY MASS INDEX: 35.44 KG/M2

## 2025-03-06 DIAGNOSIS — M99.00 SOMATIC DYSFUNCTION OF OCCIPITOCERVICAL REGION: ICD-10-CM

## 2025-03-06 DIAGNOSIS — M99.02 SEGMENTAL DYSFUNCTION OF THORACIC REGION: ICD-10-CM

## 2025-03-06 DIAGNOSIS — M50.30 DDD (DEGENERATIVE DISC DISEASE), CERVICAL: ICD-10-CM

## 2025-03-06 DIAGNOSIS — G44.219 TTH (TENSION-TYPE HEADACHE), FREQUENT EPISODIC TYPE: ICD-10-CM

## 2025-03-06 DIAGNOSIS — S13.9XXA CERVICAL SPRAIN, INITIAL ENCOUNTER: Primary | ICD-10-CM

## 2025-03-06 PROCEDURE — 98940 CHIROPRACT MANJ 1-2 REGIONS: CPT | Performed by: CHIROPRACTOR

## 2025-03-06 PROCEDURE — 97110 THERAPEUTIC EXERCISES: CPT | Performed by: CHIROPRACTOR

## 2025-03-06 PROCEDURE — 99214 OFFICE O/P EST MOD 30 MIN: CPT | Performed by: CHIROPRACTOR

## 2025-03-06 NOTE — PROGRESS NOTES
Initial date of service: 2/7/25    Diagnoses and all orders for this visit:    Cervical sprain, initial encounter  -     Ambulatory referral to Spine [facility] Pain Management; Future    DDD (degenerative disc disease), cervical    Segmental dysfunction of thoracic region    TTH (tension-type headache), frequent episodic type    Somatic dysfunction of occipitocervical region    Mechanical neck/ubp and headache causally related to WC injury. Pt's improvement after 1 month inadequate for continuing chiropractic care; pain levels persist at the same level and same frequency overall with minimal improvement in ROM/mm hypertonicity/jt restricitons;therefore, referring to Spine and Pain for injection consult. Discussed sleeping with rolled up face cloth in cylindrical shape under neck with no pillow; discussed importance of maintaining good postures  Spent greater than 30 min today reviewing chart/imaging, hx, pe, ddx, tx options. Pt understood and agreed to cessation of tx and referral to Spine and Pain for injection consult    TREATMENT:   Patient education. Encouraged pt to maintain home stretching/exercise program    HPI:  Juana Bojorge Reyes is a 44 y.o. female   Chief Complaint   Patient presents with    Neck Pain     Neck pain-6     Pt presents utilizing  for tx for neck and RUE pain onset 8/20/24 with work injury  3/6: pt reports she can move her head/neck more than before; has lower periods of pain for longer periods during the day but still constant. Pt reports she has difficulty sleeping laying down therefore she sleeps with several pillows behind her propping her up and in semi-sitting position    Neck Pain   This is a new problem. The current episode started more than 1 month ago. The problem occurs constantly. The problem has been gradually improving. Associated with: WC injury. The pain is present in the midline, right side and occipital region. The quality of the pain is described as aching and  stabbing. The symptoms are aggravated by bending, position and twisting (sitting). Worse during: worst at end of day. Associated symptoms include headaches. Associated symptoms comments: Suboccipital and occipital. She has tried heat and NSAIDs for the symptoms. The treatment provided mild relief.     History reviewed. No pertinent past medical history.   The following portions of the patient's history were reviewed and updated as appropriate: allergies, past family history, past medical history, past social history, past surgical history, and problem list.  Review of Systems   Musculoskeletal:  Positive for neck pain.   Neurological:  Positive for headaches.     Physical Exam  Neck:        Comments: Pnful and limited in Rrot 30 degrees, Rrot 50 degrees, Rlf 10, Ext 5, Fl 30  Musculoskeletal:      Cervical back: Signs of trauma and crepitus present. Pain with movement and muscular tenderness present. Decreased range of motion.      Thoracic back: Spasms and tenderness present. Decreased range of motion.   Lymphadenopathy:      Cervical: No cervical adenopathy.   Neurological:      Mental Status: She is alert and oriented to person, place, and time.      Gait: Gait is intact.   Psychiatric:         Mood and Affect: Mood and affect normal.     SOFT TISSUE ASSESSMENT: Hypertonicity and tenderness palpated B C5-T4 erector spinae, upper traps, R lev scap, R SCM, R scalene, R rhomboid, B suboccipitals JOINT RECTRICTIONS: C0/1, C4-T4     Return for Referred to Spine and Pain.

## 2025-04-09 ENCOUNTER — TELEPHONE (OUTPATIENT)
Dept: OBGYN CLINIC | Facility: CLINIC | Age: 45
End: 2025-04-09

## 2025-04-09 NOTE — TELEPHONE ENCOUNTER
Pt missed last two appts. Per Dr. Gibbs, Pt is not allowed a reschedule.    Pt is No Show - 4/9/25 - [facility]  Pt is No Show - 4/2/25 - [facility]  Pt is No Show - 3/19/25 - [facility]  Pt is No Show - 3/12/25 - [facility]

## 2025-04-15 ENCOUNTER — TELEPHONE (OUTPATIENT)
Dept: OTHER | Facility: OTHER | Age: 45
End: 2025-04-15

## 2025-04-15 NOTE — TELEPHONE ENCOUNTER
"Patient stated, \"I would like to schedule an appointment with pain management. \"     Please call pt when office reopens   "

## 2025-04-22 ENCOUNTER — TELEPHONE (OUTPATIENT)
Dept: OTHER | Facility: OTHER | Age: 45
End: 2025-04-22

## 2025-04-22 NOTE — TELEPHONE ENCOUNTER
Outreach Reason: [facility] Screening Event:    Outreach made to patient regarding Mammogram screening event May 3rd at [facility] Radiology department from 8-2pm. Information provided and patient encouraged to call [facility] office to determine eligibility for free Breast Cancer Screening.    Contact information: Phone:[phone] to qualify them for our [facility] program for a free Mammogram.      Hope to see you at the event.